# Patient Record
Sex: FEMALE | Race: OTHER
[De-identification: names, ages, dates, MRNs, and addresses within clinical notes are randomized per-mention and may not be internally consistent; named-entity substitution may affect disease eponyms.]

---

## 2020-11-15 ENCOUNTER — HOSPITAL ENCOUNTER (EMERGENCY)
Dept: HOSPITAL 72 - EMR | Age: 25
Discharge: HOME | End: 2020-11-15
Payer: COMMERCIAL

## 2020-11-15 VITALS — SYSTOLIC BLOOD PRESSURE: 133 MMHG | DIASTOLIC BLOOD PRESSURE: 74 MMHG

## 2020-11-15 VITALS — DIASTOLIC BLOOD PRESSURE: 71 MMHG | SYSTOLIC BLOOD PRESSURE: 124 MMHG

## 2020-11-15 VITALS — HEIGHT: 61 IN | BODY MASS INDEX: 24.35 KG/M2 | WEIGHT: 129 LBS

## 2020-11-15 DIAGNOSIS — R07.9: ICD-10-CM

## 2020-11-15 DIAGNOSIS — M94.0: Primary | ICD-10-CM

## 2020-11-15 DIAGNOSIS — Z20.828: ICD-10-CM

## 2020-11-15 DIAGNOSIS — E87.6: ICD-10-CM

## 2020-11-15 LAB
ADD MANUAL DIFF: NO
ALBUMIN SERPL-MCNC: 3.9 G/DL (ref 3.4–5)
ALBUMIN/GLOB SERPL: 1 {RATIO} (ref 1–2.7)
ALP SERPL-CCNC: 67 U/L (ref 46–116)
ALT SERPL-CCNC: 11 U/L (ref 12–78)
ANION GAP SERPL CALC-SCNC: 7 MMOL/L (ref 5–15)
AST SERPL-CCNC: 14 U/L (ref 15–37)
BASOPHILS NFR BLD AUTO: 0.7 % (ref 0–2)
BILIRUB SERPL-MCNC: 0.4 MG/DL (ref 0.2–1)
BUN SERPL-MCNC: 5 MG/DL (ref 7–18)
CALCIUM SERPL-MCNC: 8.9 MG/DL (ref 8.5–10.1)
CHLORIDE SERPL-SCNC: 100 MMOL/L (ref 98–107)
CO2 SERPL-SCNC: 27 MMOL/L (ref 21–32)
CREAT SERPL-MCNC: 0.8 MG/DL (ref 0.55–1.3)
EOSINOPHIL NFR BLD AUTO: 0.1 % (ref 0–3)
ERYTHROCYTE [DISTWIDTH] IN BLOOD BY AUTOMATED COUNT: 11.3 % (ref 11.6–14.8)
GLOBULIN SER-MCNC: 3.8 G/DL
HCT VFR BLD CALC: 43.4 % (ref 37–47)
HGB BLD-MCNC: 14.7 G/DL (ref 12–16)
LYMPHOCYTES NFR BLD AUTO: 16.2 % (ref 20–45)
MCV RBC AUTO: 102 FL (ref 80–99)
MONOCYTES NFR BLD AUTO: 4 % (ref 1–10)
NEUTROPHILS NFR BLD AUTO: 79 % (ref 45–75)
PLATELET # BLD: 232 K/UL (ref 150–450)
POTASSIUM SERPL-SCNC: 3.4 MMOL/L (ref 3.5–5.1)
RBC # BLD AUTO: 4.24 M/UL (ref 4.2–5.4)
SODIUM SERPL-SCNC: 134 MMOL/L (ref 136–145)
WBC # BLD AUTO: 9.7 K/UL (ref 4.8–10.8)

## 2020-11-15 PROCEDURE — 85379 FIBRIN DEGRADATION QUANT: CPT

## 2020-11-15 PROCEDURE — 84484 ASSAY OF TROPONIN QUANT: CPT

## 2020-11-15 PROCEDURE — 93005 ELECTROCARDIOGRAM TRACING: CPT

## 2020-11-15 PROCEDURE — 96361 HYDRATE IV INFUSION ADD-ON: CPT

## 2020-11-15 PROCEDURE — 80307 DRUG TEST PRSMV CHEM ANLYZR: CPT

## 2020-11-15 PROCEDURE — 80053 COMPREHEN METABOLIC PANEL: CPT

## 2020-11-15 PROCEDURE — 99284 EMERGENCY DEPT VISIT MOD MDM: CPT

## 2020-11-15 PROCEDURE — 71045 X-RAY EXAM CHEST 1 VIEW: CPT

## 2020-11-15 PROCEDURE — 84443 ASSAY THYROID STIM HORMONE: CPT

## 2020-11-15 PROCEDURE — 96374 THER/PROPH/DIAG INJ IV PUSH: CPT

## 2020-11-15 PROCEDURE — 84439 ASSAY OF FREE THYROXINE: CPT

## 2020-11-15 PROCEDURE — 85025 COMPLETE CBC W/AUTO DIFF WBC: CPT

## 2020-11-15 PROCEDURE — 83735 ASSAY OF MAGNESIUM: CPT

## 2020-11-15 PROCEDURE — 36415 COLL VENOUS BLD VENIPUNCTURE: CPT

## 2020-11-15 PROCEDURE — 81025 URINE PREGNANCY TEST: CPT

## 2020-11-15 NOTE — DIAGNOSTIC IMAGING REPORT
EXAM:

  XR Chest, 1 View

 

CLINICAL HISTORY:

  PAIN

 

TECHNIQUE:

  Frontal view of the chest.

 

COMPARISON:

  No relevant prior studies available.

 

FINDINGS:

  Lungs:  Unremarkable.  No consolidation.

  Pleural space:  Unremarkable.  No pneumothorax.

  Heart:  Unremarkable.  No cardiomegaly.

  Mediastinum:  Unremarkable.

  Bones/joints:  Unremarkable.

 

IMPRESSION:     

No focal infiltrate.

## 2020-11-15 NOTE — NUR
ED Nurse Note: Pt walked in from home c/o left sided chest pressure/pain that 
worsens with inhalation. Pain started around 2100 last night. Respirations even 
and unlabored on room air. Vitals stable as documented. A+Ox4, speaking in 
complete sentences.

## 2020-11-15 NOTE — NUR
ED Nurse Note: Pt became tachycardic @ 108. Spoke with patient and pt reported 
feeling nervous. Reassured patient with therapeutic communication. Pt's heart 
rate decreased back to 80s, sinus. Pt more calm.

## 2024-10-16 NOTE — EMERGENCY ROOM REPORT
Assessment & Plan       Encounter for Medicare annual wellness exam  - CBC with platelets and differential; Future      Hyperlipidemia LDL goal <100 - stable  - Continue to work on low saturated fat diet  - Lipid Profile (Chol, Trig, HDL, LDL calc); Future      Primary hypertension - BP elevated  - Continue BP medication as directed  - I updated your cardiology provider  - Lipid Profile (Chol, Trig, HDL, LDL calc); Future      History of embolism and infarction (H)  - Continue anti-coagulation      Moderate episode of recurrent major depressive disorder (H)  - escitalopram (LEXAPRO) 10 MG tablet; Take 1 tablet (10 mg) by mouth daily.      Primary insomnia - stable  - Stable      Vitamin D deficiency  - Vitamin D level      Encounter for screening mammogram for malignant neoplasm of breast  - MA Screen Bilateral w/Burt; Future      Colon cancer screening  - Colonoscopy Screening  Referral; Future        Patient has been advised of split billing requirements and indicates understanding: Yes        Please continue to take all medication as directed  Please notify your pharmacy or our refill line of future refills needed.  Please return sooner than your next scheduled appointment with any concerns.      When your lab results return, we will call you with abnormal findings  You can also view this information in your MyChart, if you have an account.  Please call or [facility] message us with any questions you may have.          Return in about 6 months (around 4/16/2025).        [facility]  [phone]   Preventive Care Advice   This is general advice given by our system to help you stay healthy. However, your care team may have specific advice just for you. Please talk to your care team about your preventive care needs.  Nutrition  Eat 5 or more servings of fruits and vegetables each day.  Try wheat bread, brown rice and whole grain pasta (instead of white bread, rice, and pasta).  Get enough calcium and  History of Present Illness


General


Chief Complaint:  Chest Pain


Source:  Patient





Present Illness


HPI


Patient is a 25-year-old female denies any significant past medical history who 

presents to the ER complaining of left-sided chest pain.  Patient states the 

pain began last night.  She states that it began at rest.  She states that is 

worse when laying flat or taking a deep breath.  She denies any fever or chills.

 She denies any cough.  She denies any sick contacts or recent travel.  She 

denies any recent illnesses.  She denies any family history of early 

cardiovascular disease or sudden death.  Patient denies any recent travel.  She 

denies smoking or drug use.  Patient denies any abdominal pain nausea or 

vomiting.  She denies any lower extremity pain or edema.


Allergies:  


Coded Allergies:  


     No Known Allergies (Unverified , 11/15/20)





COVID-19 Screening


Contact w/high risk pt:  No


Experienced COVID-19 symptoms?:  No


COVID-19 Testing performed PTA:  No





Patient History


Last Menstrual Period:  11/09/2020


Reviewed Nursing Documentation:  PMH: Agreed; PSxH: Agreed





Nursing Documentation-PMH


Past Medical History:  No Stated History





Review of Systems


All Other Systems:  negative except mentioned in HPI





Physical Exam





Vital Signs








  Date Time  Temp Pulse Resp B/P (MAP) Pulse Ox O2 Delivery O2 Flow Rate FiO2


 


11/15/20 09:12 98.1 87 16 121/79 (93) 100 Room Air  








Sp02 EP Interpretation:  reviewed, normal


General Appearance:  no apparent distress, alert, GCS 15, non-toxic


Head:  normocephalic, atraumatic


Eyes:  bilateral eye normal inspection, bilateral eye PERRL


ENT:  hearing grossly normal, normal pharynx, no angioedema, normal voice


Neck:  full range of motion, supple/symm/no masses


Respiratory:  chest non-tender, lungs clear, normal breath sounds, speaking full

sentences


Cardiovascular #1:  tachycardia


Gastrointestinal:  normal bowel sounds, non tender, soft, non-distended, no 

guarding, no rebound


Rectal:  deferred


Musculoskeletal:  no calf tenderness, no lower extremity edema


Neurologic:  CNs III-XII nml as tested, oriented x3


Psychiatric:  no suicidal/homicidal ideation


Skin:  no rash


Lymphatic:  no adenopathy





Medical Decision Making


Diagnostic Impression:  


   Primary Impression:  


   Costochondritis


   Additional Impressions:  


   Hypokalemia


   Chest pain


ER Course


Patient given IV Toradol.  On reevaluation she states that her pain has 

improved.  I suspect the chest pain the patient is presenting with is atypical 

in nature. Aortic dissection was considered but in the physical exam the patient

has equal pulses in all extremities, no new focal neurological deficits, no 

apparent diastolic murmur on the cardiac exam, and no widening of the 

mediastinum on CXR. Pulmonary embolism was also considered however I believe 

patient to be low risk given normal heart rate now that she is no longer anxio

us, negative D-dimer, no evidence of hypoxemia, no significant unilateral leg 

swelling, no recent travel, no history of cancer, no right axis deviation on 

EKG, and no recent surgery. I also doubt that this is acute coronary syndrome 

since EKG shows no STEMI, troponin is normal, and the patients chest pain has 

resolved. The patient was counseled that, though unlikely, the possibility of an

emergent cause of chest pain may still be present and that the patient should 

return immediately if symptoms persists or worsen. I believe the patient is 

stable for discharge to follow-up with their PMD.





Laboratory Tests








Test


 11/15/20


09:25 11/15/20


09:30


 


White Blood Count


 9.7 K/UL


(4.8-10.8) 





 


Red Blood Count


 4.24 M/UL


(4.20-5.40) 





 


Hemoglobin


 14.7 G/DL


(12.0-16.0) 





 


Hematocrit


 43.4 %


(37.0-47.0) 





 


Mean Corpuscular Volume


 102 FL (80-99)


H 





 


Mean Corpuscular Hemoglobin


 34.7 PG


(27.0-31.0)  H 





 


Mean Corpuscular Hemoglobin


Concent 33.9 G/DL


(32.0-36.0) 





 


Red Cell Distribution Width


 11.3 %


(11.6-14.8)  L 





 


Platelet Count


 232 K/UL


(150-450) 





 


Mean Platelet Volume


 7.3 FL


(6.5-10.1) 





 


Neutrophils (%) (Auto)


 79.0 %


(45.0-75.0)  H 





 


Lymphocytes (%) (Auto)


 16.2 %


(20.0-45.0)  L 





 


Monocytes (%) (Auto)


 4.0 %


(1.0-10.0) 





 


Eosinophils (%) (Auto)


 0.1 %


(0.0-3.0) 





 


Basophils (%) (Auto)


 0.7 %


(0.0-2.0) 





 


D-Dimer


 0.19 mg/L FEU


(0.00-0.49) 





 


Sodium Level


 134 MMOL/L


(136-145)  L 





 


Potassium Level


 3.4 MMOL/L


(3.5-5.1)  L 





 


Chloride Level


 100 MMOL/L


() 





 


Carbon Dioxide Level


 27 MMOL/L


(21-32) 





 


Anion Gap


 7 mmol/L


(5-15) 





 


Blood Urea Nitrogen


 5 mg/dL (7-18)


L 





 


Creatinine


 0.8 MG/DL


(0.55-1.30) 





 


Estimated Glomerular


Filtration Rate > 60 mL/min


(>60) 





 


Glucose Level


 135 MG/DL


()  H 





 


Calcium Level


 8.9 MG/DL


(8.5-10.1) 





 


Magnesium Level


 1.8 MG/DL


(1.8-2.4) 





 


Total Bilirubin


 0.4 MG/DL


(0.2-1.0) 





 


Aspartate Amino Transferase


(AST) 14 U/L (15-37)


L 





 


Alanine Aminotransferase (ALT)


 11 U/L (12-78)


L 





 


Alkaline Phosphatase


 67 U/L


() 





 


Troponin I


 0.010 ng/mL


(0.000-0.056) 





 


Total Protein


 7.7 G/DL


(6.4-8.2) 





 


Albumin


 3.9 G/DL


(3.4-5.0) 





 


Globulin 3.8 g/dL   


 


Albumin/Globulin Ratio 1.0 (1.0-2.7)   


 


Thyroid Stimulating Hormone


(TSH) 1.020 uiU/mL


(0.358-3.740) 





 


Free Thyroxine Pending   


 


Urine HCG, Qualitative


 


 Negative


(NEGATIVE)


 


Urine Opiates Screen


 


 Negative


(NEGATIVE)


 


Urine Barbiturates Screen


 


 Negative


(NEGATIVE)


 


Phencyclidine (PCP) Screen


 


 Negative


(NEGATIVE)


 


Urine Amphetamines Screen


 


 Negative


(NEGATIVE)


 


Urine Benzodiazepines Screen


 


 Negative


(NEGATIVE)


 


Urine Cocaine Screen


 


 Negative


(NEGATIVE)


 


Urine Marijuana (THC) Screen


 


 Negative


(NEGATIVE)








Microbiology








 Date/Time


Source Procedure


Growth Status





 


 11/15/20 09:50


Nasopharynx SARS-CoV-2 RdRp Gene Assay - Final Complete








EKG Diagnostic Results


Troponin ordered:  Yes


When was troponin ordered?:  Nov 15, 2020


EKG Time:  09:20


EP Interpretation:  Ally Romero MD


Rate:  tachycardiac - 108 bpm


Rhythm:  other - Sinus tachycardia


ST Segments:  no acute changes


ASA given to the pt in ED:  No





Rhythm Strip Diag. Results


Rhythm Strip Time:  09:33


EP Interpretation:  yes - Ally Romero MD


Rate:  111 bpm


Rhythm:  no PVC's, no ectopy, other - Sinus tachycardia





Chest X-Ray Diagnostic Results


Chest X-Ray Diagnostic Results :  


   Chest X-Ray Ordered:  Yes


   # of Views/Limited/Complete:  1 View


   Indication:  Chest Pain


   EP Interpretation:  Yes


   Interpretation:  no consolidation, no effusion, no pneumothorax, no acute 

cardiopulmonary disease


   Impression:  No acute disease


   Electronically Signed by:  Ally Romero MD





Last Vital Signs








  Date Time  Temp Pulse Resp B/P (MAP) Pulse Ox O2 Delivery O2 Flow Rate FiO2


 


11/15/20 09:12 98.1 87 16 121/79 (93) 100 Room Air  








Disposition:  HOME, SELF-CARE


Condition:  Stable


Scripts


Ibuprofen* (MOTRIN*) 600 Mg Tablet


600 MG ORAL FOUR TIMES A DAY, #30 TAB 0 Refills


   Prov: Ally Romero M.D.         11/15/20


Referrals:  


NON PHYSICIAN (PCP)





Additional Instructions:  


The patient was provided with discharge instructions, notified to follow-up with

a primary care doctor and or specialist in the next 24-48 hours, and to return 

to the ED if they have worsening of their symptoms. 





Please note that this report is being documented using DRAG[employer] technology.


This can lead to erroneous entry secondary to incorrect interpretation by the 

dictating instrument.











Ally Romero M.D.        Nov 15, 2020 09:33 vitamin D. Check the label on foods and aim for 100% of the RDA (recommended daily allowance).  Lifestyle  Exercise at least 150 minutes each week  (30 minutes a day, 5 days a week).  Do muscle strengthening activities 2 days a week. These help control your weight and prevent disease.  No smoking.  Wear sunscreen to prevent skin cancer.  Have a dental exam and cleaning every 6 months.  Yearly exams  See your health care team every year to talk about:  Any changes in your health.  Any medicines your care team has prescribed.  Preventive care, family planning, and ways to prevent chronic diseases.  Shots (vaccines)   HPV shots (up to age 26), if you've never had them before.  Hepatitis B shots (up to age 59), if you've never had them before.  COVID-19 shot: Get this shot when it's due.  Flu shot: Get a flu shot every year.  Tetanus shot: Get a tetanus shot every 10 years.  Pneumococcal, hepatitis A, and RSV shots: Ask your care team if you need these based on your risk.  Shingles shot (for age 50 and up)  General health tests  Diabetes screening:  Starting at age 35, Get screened for diabetes at least every 3 years.  If you are younger than age 35, ask your care team if you should be screened for diabetes.  Cholesterol test: At age 39, start having a cholesterol test every 5 years, or more often if advised.  Bone density scan (DEXA): At age 50, ask your care team if you should have this scan for osteoporosis (brittle bones).  Hepatitis C: Get tested at least once in your life.  STIs (sexually transmitted infections)  Before age 24: Ask your care team if you should be screened for STIs.  After age 24: Get screened for STIs if you're at risk. You are at risk for STIs (including HIV) if:  You are sexually active with more than one person.  You don't use condoms every time.  You or a partner was diagnosed with a sexually transmitted infection.  If you are at risk for HIV, ask about PrEP medicine to prevent HIV.  Get  tested for HIV at least once in your life, whether you are at risk for HIV or not.  Cancer screening tests  Cervical cancer screening: If you have a cervix, begin getting regular cervical cancer screening tests starting at age 21.  Breast cancer scan (mammogram): If you've ever had breasts, begin having regular mammograms starting at age 40. This is a scan to check for breast cancer.  Colon cancer screening: It is important to start screening for colon cancer at age 45.  Have a colonoscopy test every 10 years (or more often if you're at risk) Or, ask your provider about stool tests like a FIT test every year or Cologuard test every 3 years.  To learn more about your testing options, visit:   .  For help making a decision, visit:   https://bit.ly/kf23395.  Prostate cancer screening test: If you have a prostate, ask your care team if a prostate cancer screening test (PSA) at age 55 is right for you.  Lung cancer screening: If you are a current or former smoker ages 50 to 80, ask your care team if ongoing lung cancer screenings are right for you.  For informational purposes only. Not to replace the advice of your health care provider. Copyright   2023 [employer]. All rights reserved. Clinically reviewed by the [facility]. [employer] 659362 - REV 01/24.